# Patient Record
Sex: FEMALE | Race: WHITE
[De-identification: names, ages, dates, MRNs, and addresses within clinical notes are randomized per-mention and may not be internally consistent; named-entity substitution may affect disease eponyms.]

---

## 2018-04-30 LAB
BASOPHILS # BLD AUTO: 0.1 10*3/UL (ref 0–0.1)
BASOPHILS NFR BLD AUTO: 1 % (ref 0–1)
DEPRECATED NEUTROPHILS # BLD AUTO: 6.8 10*3/UL (ref 2.1–6.9)
EOSINOPHIL # BLD AUTO: 0.1 10*3/UL (ref 0–0.4)
EOSINOPHIL NFR BLD AUTO: 1.3 % (ref 0–6)
ERYTHROCYTE [DISTWIDTH] IN CORD BLOOD: 12.8 % (ref 11.7–14.4)
HCT VFR BLD AUTO: 39.4 % (ref 34.2–44.1)
HGB BLD-MCNC: 13 G/DL (ref 12–16)
LYMPHOCYTES # BLD: 2.3 10*3/UL (ref 1–3.2)
LYMPHOCYTES NFR BLD AUTO: 22.9 % (ref 18–39.1)
MCH RBC QN AUTO: 29.3 PG (ref 28–32)
MCHC RBC AUTO-ENTMCNC: 33 G/DL (ref 31–35)
MCV RBC AUTO: 88.9 FL (ref 81–99)
MONOCYTES # BLD AUTO: 0.8 10*3/UL (ref 0.2–0.8)
MONOCYTES NFR BLD AUTO: 8.1 % (ref 4.4–11.3)
NEUTS SEG NFR BLD AUTO: 66.4 % (ref 38.7–80)
PLATELET # BLD AUTO: 247 X10E3/UL (ref 140–360)
RBC # BLD AUTO: 4.43 X10E6/UL (ref 3.6–5.1)

## 2018-05-10 ENCOUNTER — HOSPITAL ENCOUNTER (OUTPATIENT)
Dept: HOSPITAL 88 - OR | Age: 73
Discharge: HOME | End: 2018-05-10
Attending: INTERNAL MEDICINE
Payer: MEDICARE

## 2018-05-10 DIAGNOSIS — R03.0: ICD-10-CM

## 2018-05-10 DIAGNOSIS — K64.8: ICD-10-CM

## 2018-05-10 DIAGNOSIS — K29.70: ICD-10-CM

## 2018-05-10 DIAGNOSIS — Z88.2: ICD-10-CM

## 2018-05-10 DIAGNOSIS — Z01.810: ICD-10-CM

## 2018-05-10 DIAGNOSIS — I45.10: ICD-10-CM

## 2018-05-10 DIAGNOSIS — K59.00: ICD-10-CM

## 2018-05-10 DIAGNOSIS — Z01.812: ICD-10-CM

## 2018-05-10 DIAGNOSIS — K21.9: ICD-10-CM

## 2018-05-10 DIAGNOSIS — Z12.11: Primary | ICD-10-CM

## 2018-05-10 PROCEDURE — 85025 COMPLETE CBC W/AUTO DIFF WBC: CPT

## 2018-05-10 PROCEDURE — 93005 ELECTROCARDIOGRAM TRACING: CPT

## 2018-05-10 PROCEDURE — 45378 DIAGNOSTIC COLONOSCOPY: CPT

## 2018-05-10 PROCEDURE — 82948 REAGENT STRIP/BLOOD GLUCOSE: CPT

## 2018-05-10 PROCEDURE — 36415 COLL VENOUS BLD VENIPUNCTURE: CPT

## 2018-05-10 NOTE — XMS REPORT
Patient Summary Document

 Created on: 05/10/2018



TIA VIGIL

External Reference #: [id number]

: 1945

Sex: Female



Demographics







 Address  [address] [place] [address]  [address]

 

 Home Phone  [phone]

 

 Preferred Language  Unknown

 

 Marital Status  Unknown

 

 Anglican Affiliation  Unknown

 

 Race  Unknown

 

 Ethnic Group  Unknown





Author







 Author  [facility]

 

 Address  Unknown

 

 Phone  Unavailable







Care Team Providers







 Care Team Member Name  Role  Phone

 

 ENRIQUE VERDE  Unavailable  Unavailable

 

 FABIOLA FLORES  Unavailable  Unavailable







Problems

This patient has no known problems.



Allergies, Adverse Reactions, Alerts

This patient has no known allergies or adverse reactions.



Medications

This patient has no known medications.



Results







 Test Description  Test Time  Test Comments  Text Results  Atomic Results  
Result Comments









 POCT-GLUCOSE METER  2018 07:03:00       









   

 

 POC-GLUCOSE METER (BEAKER) (test code=1538)  106 mg/dL    TESTED AT [facility]
-[facility] [address] [address]





POCT-GLUCOSE [id number] 09:11:00* 





 Test Item  Value  Reference Range  Comments

 

 POC-GLUCOSE METER (BEAKER) (test code=1538)  112 mg/dL    TESTED AT [facility] [address] [address]

## 2018-05-10 NOTE — XMS REPORT
Clinical Summary

 Created on: 05/10/2018



Tia Vigil

External Reference #: [id number]

: 1945

Sex: Female



Demographics







 Address  [address] [place]  [address]

 

 Home Phone  [phone]

 

 Preferred Language  English

 

 Marital Status  

 

 Mandaen Affiliation  [id number]

 

 Race  White

 

 Ethnic Group  Non-





Author







 Author  [facility]

 

 Organization  [facility]

 

 Address  Unknown

 

 Phone  Unavailable







Support







 Name  Relationship  Address  Phone

 

 No,Contacts  [employer]  Unknown  [phone]







Care Team Providers







 Care Team Member Name  Role  Phone

 

 Sekou Naik MD  PCP  [phone]







Allergies







    



  Active Allergy   Reactions   Severity   Noted Date   Comments

 

    



  Neomycin-Bacitracin-Polym     2016 



  yxin    

 

    



  Sulfa (Sulfonamide     2016 



  Antibiotics)    







Current Medications







      



  Prescription   Sig.   Disp.   Refills   Start   End Date   Status



      Date  

 

      



  azelaic acid (FINACEA) 15   APPLY A THIN LAYER TO THE       Active



  % cream   AFFECTED AREA(S) BY     



   TOPICAL ROUTE 2 TIMES PER     



   DAY     

 

      



  pantoprazole (PROTONIX)   TK 1 T PO BID       Active



  40 MG EC tablet      

 

      



  sucralfate (CARAFATE) 1   TK 1 T PO TID       Active



  gram tablet      

 

      



  ESTRACE 0.01 % (0.1   Insert 1 Tube into the     20    Active



  mg/gram) vaginal cream   vagina 3 (three) times a     16  



   week.     

 

      



  CHOLECALCIFEROL, VITAMIN   Take 1 tablet by mouth 2       Active



  D3, (VITAMIN D3 ORAL)   (two) times a day.     

 

      



  CA/D3/MAG   Take 1 tablet by mouth 2       Active



  OX/ZINC//YAMILET/BOR   (two) times a day.     



  (CALCIUM 600+D3 PLUS      



  ORAL)      

 

      



  PNV103-FA-omega3-dha-fish   Chew 1 tablet once.       Active



  oil 400-32.5 mcg-mg      



  tablet,chewable      

 

      



  cyanocobalamin (B-12   Take 500 mcg by mouth       Active



  DOTS) 500 MCG tablet   daily.     

 

      



  lidocaine (XYLOCAINE) 2 %   Apply topically as needed   30 mL   1   20   



  jellyIndications:   (catheterization).     16   17 



  Incomplete bladder      



  emptying      







Active Problems







 



  Problem   Noted Date

 

 



  Cervical syndrome   2016

 

 



  Cystocele   2016

 

 



  Dizziness and giddiness   2016

 

 



  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

 

 



  Headache   2016

 

 



  Monoclonal paraproteinemia   2016







Family History







   



  Medical History   Relation   Name   Comments

 

   



  Cancer   Brother  

 

   



  Heart disease   Father  

 

   



  Cancer   Sister  









   



  Relation   Name   Status   Comments

 

   



  Brother     

 

   



  Father     



    (Age 63) 

 

   



  Sister     







Social History







    



  Tobacco Use   Types   Packs/Day   Years Used   Date

 

    



  Never Smoker    

 

    



  Smokeless Tobacco: Never   



  Used   









   



  Alcohol Use   Drinks/Week   oz/Week   Comments

 

   



  No   









 



  Sex Assigned at Birth   Date Recorded

 

 



  Not on file 







Last Filed Vital Signs

Not on file



Plan of Treatment







   



  Health Maintenance   Due Date   Last Done   Comments

 

   



  COLONOSCOPY   1993  

 

   



  MAMMOGRAM   1993  

 

   



  SHINGRIX VACCINE (#1)   1993  

 

   



  ZOSTER VACCINE   2003  

 

   



  PNEUMOCOCCAL   2008  



  POLYSACCHARIDE VACCINE   



  AGE 65 AND OVER   

 

   



  PNEUMOCOCCAL-13   2008  

 

   



  INFLUENZA VACCINE   2018  







Results

Not on fileafter 2017



Insurance







     



  Payer   Benefit   Subscriber ID   Type   Phone   Address



   Plan /    



   Group    

 

     



  MEDICARE   MEDICARE   xxxxxxxxxx   Medicare    [place], [place]



   PART A AND    



   B    

 

     



  AETNA   AETNA   xxxxxxxxxx   HMO  



   HMO,POS,EP    



   O, MC/EC    









     



  Guarantor Name   Account   Relation to   Date of   Phone   Billing Address



   Type   Patient   Birth  

 

     



  RUDY VIGILHA   Personal/F   Self   1945   Work:   [address]     [phone]   [place] [address]



      Home: 



      [phone]

## 2018-05-10 NOTE — OPERATIVE REPORT
DATE OF PROCEDURE:  May 10, 2018 



REFERRING PHYSICIAN:  Dr. Jorge Alberto Moe 



PROCEDURE PERFORMED:  Colonoscopy.  



INDICATIONS FOR COLONOSCOPY:  Colorectal cancer screening.



MEDICATION:  Patient was done under MAC.  Please see anesthesiologist's 

note.



PROCEDURE:  With the patient in the left lateral decubitus position, the 

flexible fiberoptic Olympus colonoscope was inserted into the rectum with 

ease and advanced all the way to the cecum.  The scope was then withdrawn 

slowly.  Mucosa overlying the cecum, ascending colon, transverse colon 

appeared to be within normal limits.  A moderate amount of retained stool 

was in the distal descending and the sigmoid colon.  The rectum was 

visualized, and it was grossly within normal limits.  The scope was then 

retroflexed into the distal rectum, and small internal hemorrhoids were 

noted, none of which was actively bleeding.  The scope was then 

straightened out.  It was subsequently withdrawn.  Patient tolerated the 

procedure well.



IMPRESSION  

1. Moderate to large amount of retained stools in the distal descending 

and sigmoid colon with no obvious obstructing or constricting lesions.

2. Internal hemorrhoids, none actively bleeding.





PLAN:  Initiate Linzess 145 mcg 1 p.o. q.a.m. a.c.  Patient might benefit 

from a followup 

colonoscopy in 2 to 3 years.  









DD:  05/10/2018 09:10

DT:  05/10/2018 09:31

Job#:  [id number] 



cc:JORGE ALBERTO MOE MD

## 2018-05-10 NOTE — XMS REPORT
Clinical Summary

 Created on: 05/10/2018



Mary Mariano

External Reference #: [id number]

: 1945

Sex: Female



Demographics







 Address  [address] [place] [address] [place]  [address]

 

 Home Phone  [phone]

 

 Preferred Language  English

 

 Marital Status  Unknown

 

 Jain Affiliation  Lutheran

 

 Race  White

 

 Ethnic Group  Non-





Author







 Author  [facility]

 

 [facility]

 

 Address  Unknown

 

 Phone  Unavailable







Support







 Name  Relationship  Address  Phone

 

 , Robert Mariano  [employer]  Unknown  [phone]







Care Team Providers







 Care Team Member Name  Role  Phone

 

  PCP  Unavailable







Allergies







    



  Active Allergy   Reactions   Severity   Noted Date   Comments

 

    



  Iodine And Iodide   Rash   High   2017 



  Containing Products    

 

    



  Sulfa (Sulfonamide    High   2017   Nausea, vomiting



  Antibiotics)    

 

    



  Adhesive Tape     2017   Skin irritation

 

    



  Neomycin-Bacitracin-Polym   Other (See Comments)    2011   Blisters



  yxin    

 

    



  Benzalkonium Chloride   Other (See Comments)    2017   Blisters in skin







Current Medications







      



  Prescription   Sig.   Disp.   Refills   Start   End Date   Status



      Date  

 

      



  sucralfate (CARAFATE) 1   Take 1 g by mouth 4       Active



  gram tablet   (four) times daily.     

 

      



  folic acid (FOLVITE) 1 MG   Take 1 mg by mouth daily.       Active



  tablet      

 

      



  pantoprazole (PROTONIX)   Take 40 mg by mouth 2       Active



  40 MG tablet   (two) times daily .     

 

      



  ondansetron (ZOFRAN) 4 MG   Take 1 tablet (4 mg   10 tablet   0   20    
Active



  tablet   total) by mouth 3 (three)     17  



   times daily as needed.     

 

      



  CRANBERRY FRUIT EXTRACT   Take 1 tablet by mouth       Active



  (ELLURA ORAL)   daily.     

 

      



  CALCIUM CITRATE ORAL   Take 500 mg by mouth 2       Active



   (two) times daily.     

 

      



  ERGOCALCIFEROL, VITAMIN   Take 500 mg by mouth 2       Active



  D2, (VITAMIN D2 ORAL)   (two) times daily.     

 

      



  ascorbic acid, vitamin C,   Take 1,000 mg by mouth       Active



  (VITAMIN C) 1000 MG   daily.     



  tablet      

 

      



  HYPROMELLOSE (GENTEAL   Apply 1 drop to eye(s)       Active



  OPHT)   daily Both eyes .     

 

      



  carboxymethylcell-hyprome   Apply 1 drop to eye(s)       Active



  llose (GENTEAL GEL)   nightly Both eyes .     



  0.25-0.3 %      



  DLGlIndications: Dry Eye      

 

      



  ciprofloxacin HCl (CIPRO)   Take 250 mg by mouth       Active



  250 MG tablet   daily For 1 month .     

 

      



  CALCIUM CARBONATE   Take by mouth.      20   Discontin



  (CALCIUM 500 ORAL)       18   ued

 

      



  cholecalciferol (VITAMIN   Take 1,000 Units by mouth      20   Discontin



  D3) 1,000 unit tablet   daily.      18   ued

 

      



  APPLE CIDER VINEGAR ORAL   Take by mouth.      20   Discontin



       18   ued

 

      



  nitrofurantoin   Take 50 mg by mouth 4      20   Discontin



  (MACRODANTIN) 50 MG   (four) times daily.      18   ued



  capsule      

 

      



  cephalexin (KEFLEX) 500   Take 1 capsule (500 mg   21 capsule   0   20   



  MG capsule   total) by mouth 3 (three)     17   17 



   times daily for 7 days.     

 

      



  acetaminophen-codeine   Take 1 tablet by mouth   30 tablet   0   20   



  (TYLENOL #3) 300-30 mg   every 6 (six) hours as     17   17 



  per tablet   needed for up to 10 days.     



   Max Daily Amount: 4     



   tablets     







Active Problems







 



  Problem   Noted Date

 

 



  Hypertropia of right eye   2018







Encounters







    



  Date   Type   Specialty   Care Team   Description

 

    



  2018   [facility]    Enrique Will MD 



   Encounter   

 

    



  2018   Anesthesia    Kitty Cameron 



   Event   

 

    



  2018   Procedure Pass   

 

    



  2018   Surgery    Enrique Will MD   STRABISMUS



      SURGERY,VERTICAL MUSCLE

 

    



  2018   Hospital   Pre-Admission Testing  



   Encounter   

 

    



  2017   [facility]    Perfecto Olvera,   Left nasolacrimal duct



   Encounter    MD   obstruction (Primary Dx)

 

    



  2017   Anesthesia    Jay Anderson 



   Event    MD Jerald 

 

    



  2017   Procedure Pass   

 

    



  2017   Surgery    Perfecto Olvera   DACRYOCYSTORHINOSTOMY



     MD 

 

    



  2017   Hospital   Radiology   Ron Sahni Numbness and 
tingling



   Encounter    MD 

 

    



  2017   Outside Orders    Bora, Ron [place],   Numbness and 
tingling



     MD   (Primary Dx)

 

    



  2017   Hospital   Radiology   Ron Sahni,   
Osteoarthritis of lumbar



   Encounter    MD   spine, unspecified spinal



      osteoarthritis



      complication status

 

    



  2017   Hospital   Radiology   Ron Sahni   
Osteoarthritis of lumbar



   Encounter    MD   spine, unspecified spinal



      osteoarthritis



      complication status

 

    



  2017   Outside Orders    Broa, Ron [place],   Osteoarthritis of 
lumbar



     MD   spine, unspecified spinal



      osteoarthritis



      complication status



      (Primary Dx)



after 2017



Social History







    



  Tobacco Use   Types   Packs/Day   Years Used   Date

 

    



  Never Smoker    

 

    



  Smokeless Tobacco: Never   



  Used   









   



  Alcohol Use   Drinks/Week   oz/Week   Comments

 

   



  No   









 



  Sex Assigned at Birth   Date Recorded

 

 



  Not on file 







Last Filed Vital Signs







  



  Vital Sign   Reading   Time Taken

 

  



  Blood Pressure   155/71   2018  8:42 AM CDT

 

  



  Pulse   77   2018  8:42 AM CDT

 

  



  Temperature   36.3   C (97.3   F)   2018  8:06 AM CDT

 

  



  Respiratory Rate   13   2018  8:42 AM CDT

 

  



  Oxygen Saturation   100%   2018  8:42 AM CDT

 

  



  Inhaled Oxygen   -   -



  Concentration  

 

  



  Weight   56.7 kg (125 lb)   2018  4:00 PM CDT

 

  



  Height   166.4 cm (5' 5.5")   2018  4:00 PM CDT

 

  



  Body Mass Index   20.48   2018  4:00 PM CDT







Plan of Treatment





Not on file



Procedures







    



  Procedure Name   Priority   Date/Time   Associated Diagnosis   Comments

 

    



  STRABISMUS    2018   H50.21- HYPERTROPIA OF 



  SURGERY,POSTERIOR    7:30 AM CDT   RIGHT EYE 



  FIXATION SUTURE    

 

  



   Case Notes



   REBECCA DAHL TO



   CONFIRM



   CORRECT



   CPT CODE



   FOR



   "STRABISMU



   S SURGERY



   W/



   ADJUCTABLE



   SUTURE



   PLACEMENT"



   FROM



   "01113" TO



   "66927"SHEREEN



   E



   RESCHEDULE



   D PER



   SHANNON ON



   18



   MOVE CASE



   FROM



   18/TO



   18RE



   SCHEDULED



   CASE FROM



   18 TO



   3/23/18



   HESHAM ON



   2



   /15/18@12:



   58 STATED



   THAT



   3/23/18



   WAS WHERE



   IT SHOULD



   HAVE BEEN



   MOVED



   TO?/LRK.



   START TIME



   8:30 PER



   HESHAM ON



   2/15/18@1:



   15VIA FAX



   ON 3/20/18



   CASE MOVED



   TO 18



   @ 7:30/LRK

 

    



  STRABISMUS    2018   H50.21- HYPERTROPIA OF 



  SURGERY,VERTICAL MUSCLE    7:30 AM CDT   RIGHT EYE 

 

  



   Case Notes



   REBECCA DAHL TO



   CONFIRM



   CORRECT



   CPT CODE



   FOR



   "STRABISMU



   S SURGERY



   W/



   ADJUCTABLE



   SUTURE



   PLACEMENT"



   FROM



   "79522" TO



   "27193"SHEREEN



   E



   RESCHEDULE



   D ANICETO ORTEGA ON



   18



   MOVE CASE



   FROM



   18/TO



   18RE



   SCHEDULED



   CASE FROM



   18 TO



   3/23/18



   HESHAM ON



   2



   /15/18@12:



   58 STATED



   THAT



   3/23/18



   WAS WHERE



   IT SHOULD



   HAVE BEEN



   MOVED



   TO?/LRK.



   START TIME



   8:30 PER



   HESHAM ON



   2/15/18@1:



   15VIA FAX



   ON 3/20/18



   CASE MOVED



   TO 18



   @ 7:30/LRK

 

    



  PROBING,NASOLACRIMAL DUCT    2017   H04.552- ACQUIRED 



  INSERTION TUBE/ STENT    10:00 AM CST   STENOSIS OF LEFT 



     NASOLACRIMAL DUCT 

 

    



  DACRYOCYSTORHINOSTOMY    2017   H04.552- ACQUIRED 



    10:00 AM CST   STENOSIS OF LEFT 



     NASOLACRIMAL DUCT 



after 2017



Results

* POC-Glucose meter (2018  7:02 AM)



Only the most recent of 2 results within the time period is included.





  



  Component   Value   Ref Range

 

  



  POC-Glucose Meter   106Comment: TESTED AT [facility] [address]   70 - 110 
mg/dL



   [facility]  [facility] [address] 









 



  Specimen   Performing Laboratory

 

 



  Blood   [place] [address]





* XR cervical spine comp with flex and ext (2017  1:22 PM)





 



  Specimen   Performing Laboratory

 

 



   [facility]









 Narrative

 

 



FINAL REPORT



 



PATIENT ID: [id number]



 



 



CERVICAL SPINE 7 VIEWS



 



HISTORY: Numbness and tingling, status post cervical spine fusion



 



COMPARISON: No comparison cervical spine imaging



 



FINDINGS:



 



AP, lateral, odontoid, bilateral oblique, flexion lateral, and 



extension lateral radiographs of the cervical spine were obtained.



 



Status post anterior cervical discectomy and fusion from C5-C7. 



Anterior plate and screw fusion hardware is present, without evidence 



of hardware loosening. Osseous interbody fusion has been achieved 



from C5-C7.



 



Moderate disc space narrowing at C3-C4.



 



Minimal C4 anterolisthesis at flexion and reduces at neutral and 



extension positioning.



 



Multilevel uncovertebral and facet arthropathy, with moderate 



foraminal stenosis at C3-C4, mild to moderate right foraminal 



stenosis at C4-C5, mild to moderate foraminal stenosis at C5-C6, and 



mild foraminal stenosis at C6-C7.



 



No atlantoaxial instability is appreciated.



 



Signed: Enrique Maurer MD



Report Verified Date/Time:2017 14:08:56



 



Reading Location: [facility] Radiology Reading Room



 Electronically signed by: ENRIQUE MAURER MD on 2017 02:08 PM



 









 Procedure Note

 

 



Interface, External Ris In - 2017  2:11 PM CDT



FINAL REPORT

 

PATIENT ID:   [id number]

 

 

CERVICAL SPINE 7 VIEWS

 

HISTORY: Numbness and tingling, status post cervical spine fusion

 

COMPARISON: No comparison cervical spine imaging

 

FINDINGS:

 

AP, lateral, odontoid, bilateral oblique, flexion lateral, and 

extension lateral radiographs of the cervical spine were obtained.

 

Status post anterior cervical discectomy and fusion from C5-C7. 

Anterior plate and screw fusion hardware is present, without evidence 

of hardware loosening. Osseous interbody fusion has been achieved 

from C5-C7.

 

Moderate disc space narrowing at C3-C4.

 

Minimal C4 anterolisthesis at flexion and reduces at neutral and 

extension positioning.

 

Multilevel uncovertebral and facet arthropathy, with moderate 

foraminal stenosis at C3-C4, mild to moderate right foraminal 

stenosis at C4-C5, mild to moderate foraminal stenosis at C5-C6, and 

mild foraminal stenosis at C6-C7.

 

No atlantoaxial instability is appreciated.

 

Signed: Enrique Maurer MD

Report Verified Date/Time:  2017 14:08:56

 

Reading Location: [facility] Radiology Reading Room

       Electronically signed by: ENRIQUE MAURER MD on 2017 02:08 PM

 





* XR Pelvis 1 or 2 Views (2017 11:29 AM)





 



  Specimen   Performing Laboratory

 

 



   [facility]









 Narrative

 

 



FINAL REPORT



 



PATIENT ID: [id number]



 



 



AP PELVIS



 



HISTORY: Low back and buttock pain, lumbar spondylosis, prior lumbar 



surgery



 



COMPARISON: No comparison pelvic imaging



 



FINDINGS:



 



No fracture or destructive bone lesion are identified in the pelvis.



 



Mild arthrosis of the sacroiliac joints. No symphysis pubis 



abnormality is visualized.



 



No joint space narrowing or osteophyte formation are visualized in 



the hips. There is calcification the bilateral hips suggestive of 



chondrocalcinosis. The right acetabular roof appears somewhat 



shallow, suggestive of underlying right acetabular dysplasia.



 



There are partially imaged postoperative changes in the lumbar spine 



suggestive of prior posterior decompression and posterior lumbar 



interbody fusion.



 



Signed: Enrique Maurer MD



Report Verified Date/Time:2017 11:52:30



 



Reading Location: [facility] Radiology Reading Room



 Electronically signed by: ENRIQUE MAURER MD on 2017 11:54 AM



 









 Procedure Note

 

 



Interface, External Ris In - 2017 11:54 AM CDT



FINAL REPORT

 

PATIENT ID:   [id number]

 

 

AP PELVIS

 

HISTORY: Low back and buttock pain, lumbar spondylosis, prior lumbar 

surgery

 

COMPARISON: No comparison pelvic imaging

 

FINDINGS:

 

No fracture or destructive bone lesion are identified in the pelvis.

 

Mild arthrosis of the sacroiliac joints. No symphysis pubis 

abnormality is visualized.

 

No joint space narrowing or osteophyte formation are visualized in 

the hips. There is calcification the bilateral hips suggestive of 

chondrocalcinosis. The right acetabular roof appears somewhat 

shallow, suggestive of underlying right acetabular dysplasia.

 

There are partially imaged postoperative changes in the lumbar spine 

suggestive of prior posterior decompression and posterior lumbar 

interbody fusion.

 

Signed: Enrique Maurer MD

Report Verified Date/Time:  2017 11:52:30

 

Reading Location: [facility] Radiology Reading Room

       Electronically signed by: ENRIQUE MAURER MD on 2017 11:54 AM

 





* XR lumbar spine comp with flex & ext (2017 11:29 AM)





 



  Specimen   Performing Laboratory

 

 



   GE [facility]









 Narrative

 

 



FINAL REPORT



 



PATIENT ID: [id number]



 



 



LUMBAR SPINE 7 VIEWS



 



HISTORY: Low back pain, lumbar spondylosis, prior lumbar spine surgery



 



COMPARISON: No comparison lumbar spine imaging



 



FINDINGS:



 



AP, lateral, spot lumbosacral, bilateral oblique, flexion lateral, 



and extension lateral radiographs of the lumbar spine were obtained.



 



Mild thoracolumbar dextroscoliosis with the apex at L2-L3.



 



Status post posterior decompression from L3 through L5-S1 with 



posterior lumbar interbody fusion from L3-S1. Bilateral pedicle 



screws and posterior rods are present from L3-S1. There is mild 



lucency around the L3 pedicle screws, which can be a sign of 



loosening. There is mild ventral canal of the S1 pedicle screws. 



There is mild L3 retrolisthesis that does not change from neutral to 



flexion to extension.



 



There is advanced disc space narrowing and degenerative endplate 



change at L2-L3, most pronounced anteriorly and on the left. There 



are left far lateral osteophytes at L2-L3.



 



There is moderate disc space narrowing and degenerative endplate 



change at L1-L2. There is mild leftward subluxation of L1.



 



There is moderate disc space narrowing and degenerative endplate 



change at T12-L1. There is mild leftward subluxation of T12.



 



No lumbar compression fracture.



 



Signed: Enrique Maurer MD



Report Verified Date/Time:2017 12:06:39



 



Reading Location: [facility] Radiology Reading Room



 Electronically signed by: ENRIQUE MAURER MD on 2017 12:08 PM



 









 Procedure Note

 

 



Interface, External Ris In - 2017 12:08 PM CDT



FINAL REPORT

 

PATIENT ID:   [id number]

 

 

LUMBAR SPINE 7 VIEWS

 

HISTORY: Low back pain, lumbar spondylosis, prior lumbar spine surgery

 

COMPARISON: No comparison lumbar spine imaging

 

FINDINGS:

 

AP, lateral, spot lumbosacral, bilateral oblique, flexion lateral, 

and extension lateral radiographs of the lumbar spine were obtained.

 

Mild thoracolumbar dextroscoliosis with the apex at L2-L3.

 

Status post posterior decompression from L3 through L5-S1 with 

posterior lumbar interbody fusion from L3-S1. Bilateral pedicle 

screws and posterior rods are present from L3-S1. There is mild 

lucency around the L3 pedicle screws, which can be a sign of 

loosening. There is mild ventral canal of the S1 pedicle screws. 

There is mild L3 retrolisthesis that does not change from neutral to 

flexion to extension.

 

There is advanced disc space narrowing and degenerative endplate 

change at L2-L3, most pronounced anteriorly and on the left. There 

are left far lateral osteophytes at L2-L3.

 

There is moderate disc space narrowing and degenerative endplate 

change at L1-L2. There is mild leftward subluxation of L1.

 

There is moderate disc space narrowing and degenerative endplate 

change at T12-L1. There is mild leftward subluxation of T12.

 

No lumbar compression fracture.

 

Signed: Enrique Maurer MD

Report Verified Date/Time:  2017 12:06:39

 

Reading Location: [facility] Radiology Reading Room

       Electronically signed by: ENRIQUE MAURER MD on 2017 12:08 PM

 





after 2017

## 2021-07-26 ENCOUNTER — HOSPITAL ENCOUNTER (OUTPATIENT)
Dept: HOSPITAL 88 - RAD | Age: 76
End: 2021-07-26
Attending: INTERNAL MEDICINE
Payer: MEDICARE

## 2021-07-26 DIAGNOSIS — R09.1: ICD-10-CM

## 2021-07-26 DIAGNOSIS — J40: Primary | ICD-10-CM

## 2021-07-26 PROCEDURE — 71046 X-RAY EXAM CHEST 2 VIEWS: CPT

## 2022-04-20 ENCOUNTER — HOSPITAL ENCOUNTER (OUTPATIENT)
Dept: HOSPITAL 88 - DX | Age: 77
End: 2022-04-20
Attending: INTERNAL MEDICINE
Payer: MEDICARE

## 2022-04-20 DIAGNOSIS — N39.0: Primary | ICD-10-CM

## 2022-04-20 LAB
BUN SERPL-MCNC: 8 MG/DL (ref 7–26)
BUN/CREAT SERPL: 11 (ref 6–25)
DEPRECATED APTT PLAS QN: 28.5 SECONDS (ref 23.8–35.5)
DEPRECATED INR PLAS: 0.87
EGFRCR SERPLBLD CKD-EPI 2021: 78 ML/MIN (ref 60–?)
HGB BLD-MCNC: 12.7 G/DL (ref 12–16)
PLATELET # BLD AUTO: 265 X10E3/UL (ref 140–360)
PROTHROMBIN TIME: 12.7 SECONDS (ref 11.9–14.5)

## 2022-04-20 PROCEDURE — 85730 THROMBOPLASTIN TIME PARTIAL: CPT

## 2022-04-20 PROCEDURE — 36569 INSJ PICC 5 YR+ W/O IMAGING: CPT

## 2022-04-20 PROCEDURE — 85610 PROTHROMBIN TIME: CPT

## 2022-04-20 PROCEDURE — 84520 ASSAY OF UREA NITROGEN: CPT

## 2022-04-20 PROCEDURE — 71045 X-RAY EXAM CHEST 1 VIEW: CPT

## 2022-04-20 PROCEDURE — 36415 COLL VENOUS BLD VENIPUNCTURE: CPT

## 2022-04-20 PROCEDURE — 85014 HEMATOCRIT: CPT

## 2022-04-20 PROCEDURE — 85049 AUTOMATED PLATELET COUNT: CPT

## 2022-04-20 PROCEDURE — 82565 ASSAY OF CREATININE: CPT

## 2023-01-24 ENCOUNTER — HOSPITAL ENCOUNTER (OUTPATIENT)
Dept: HOSPITAL 88 - DX | Age: 78
End: 2023-01-24
Attending: INTERNAL MEDICINE
Payer: MEDICARE

## 2023-01-24 DIAGNOSIS — N39.0: Primary | ICD-10-CM

## 2023-01-24 LAB
BUN SERPL-MCNC: 10 MG/DL (ref 7–26)
BUN/CREAT SERPL: 14 (ref 6–25)
DEPRECATED APTT PLAS QN: 27.8 SECONDS (ref 23.8–35.5)
DEPRECATED INR PLAS: 0.85
HGB BLD-MCNC: 12.9 G/DL (ref 12–16)
PLATELET # BLD AUTO: 269 X10E3/UL (ref 140–360)
PROTHROMBIN TIME: 11.8 SECONDS (ref 11.9–14.5)

## 2023-01-24 PROCEDURE — 85014 HEMATOCRIT: CPT

## 2023-01-24 PROCEDURE — 36415 COLL VENOUS BLD VENIPUNCTURE: CPT

## 2023-01-24 PROCEDURE — 85730 THROMBOPLASTIN TIME PARTIAL: CPT

## 2023-01-24 PROCEDURE — 82565 ASSAY OF CREATININE: CPT

## 2023-01-24 PROCEDURE — 85610 PROTHROMBIN TIME: CPT

## 2023-01-24 PROCEDURE — 85049 AUTOMATED PLATELET COUNT: CPT

## 2023-01-24 PROCEDURE — 36569 INSJ PICC 5 YR+ W/O IMAGING: CPT

## 2023-01-24 PROCEDURE — 71045 X-RAY EXAM CHEST 1 VIEW: CPT

## 2023-01-24 PROCEDURE — 84520 ASSAY OF UREA NITROGEN: CPT

## 2025-07-15 ENCOUNTER — HOSPITAL ENCOUNTER (OUTPATIENT)
Dept: HOSPITAL 88 - CARD | Age: 80
End: 2025-07-15
Attending: INTERNAL MEDICINE
Payer: MEDICARE

## 2025-07-15 DIAGNOSIS — H34.8322: Primary | ICD-10-CM

## 2025-07-15 PROCEDURE — 93880 EXTRACRANIAL BILAT STUDY: CPT
